# Patient Record
Sex: MALE | Race: OTHER | HISPANIC OR LATINO | URBAN - METROPOLITAN AREA
[De-identification: names, ages, dates, MRNs, and addresses within clinical notes are randomized per-mention and may not be internally consistent; named-entity substitution may affect disease eponyms.]

---

## 2024-07-26 ENCOUNTER — EMERGENCY (EMERGENCY)
Facility: HOSPITAL | Age: 34
LOS: 0 days | Discharge: HOME | End: 2024-07-26
Attending: STUDENT IN AN ORGANIZED HEALTH CARE EDUCATION/TRAINING PROGRAM
Payer: MEDICAID

## 2024-07-26 VITALS
SYSTOLIC BLOOD PRESSURE: 137 MMHG | RESPIRATION RATE: 18 BRPM | HEART RATE: 88 BPM | OXYGEN SATURATION: 97 % | DIASTOLIC BLOOD PRESSURE: 88 MMHG | TEMPERATURE: 98 F

## 2024-07-26 VITALS
DIASTOLIC BLOOD PRESSURE: 89 MMHG | HEART RATE: 112 BPM | OXYGEN SATURATION: 96 % | SYSTOLIC BLOOD PRESSURE: 153 MMHG | RESPIRATION RATE: 20 BRPM | WEIGHT: 192.9 LBS | HEIGHT: 66 IN | TEMPERATURE: 98 F

## 2024-07-26 DIAGNOSIS — M79.89 OTHER SPECIFIED SOFT TISSUE DISORDERS: ICD-10-CM

## 2024-07-26 DIAGNOSIS — S52.611A DISPLACED FRACTURE OF RIGHT ULNA STYLOID PROCESS, INITIAL ENCOUNTER FOR CLOSED FRACTURE: ICD-10-CM
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
## 2024-07-26 DIAGNOSIS — S52.571A OTHER INTRAARTICULAR FRACTURE OF LOWER END OF RIGHT RADIUS, INITIAL ENCOUNTER FOR CLOSED FRACTURE: ICD-10-CM

## 2024-07-26 DIAGNOSIS — M79.641 PAIN IN RIGHT HAND: ICD-10-CM
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
## 2024-07-26 PROCEDURE — 99285 EMERGENCY DEPT VISIT HI MDM: CPT | Mod: 25

## 2024-07-26 PROCEDURE — 25605 CLTX DST RDL FX/EPHYS SEP W/: CPT | Mod: 54,RT

## 2024-07-26 PROCEDURE — 73110 X-RAY EXAM OF WRIST: CPT | Mod: RT

## 2024-07-26 PROCEDURE — 73090 X-RAY EXAM OF FOREARM: CPT | Mod: RT

## 2024-07-26 PROCEDURE — 25605 CLTX DST RDL FX/EPHYS SEP W/: CPT | Mod: RT

## 2024-07-26 PROCEDURE — 73110 X-RAY EXAM OF WRIST: CPT | Mod: 26,RT

## 2024-07-26 PROCEDURE — 73130 X-RAY EXAM OF HAND: CPT | Mod: 26,RT

## 2024-07-26 PROCEDURE — 99285 EMERGENCY DEPT VISIT HI MDM: CPT | Mod: 57

## 2024-07-26 PROCEDURE — 73130 X-RAY EXAM OF HAND: CPT | Mod: RT

## 2024-07-26 PROCEDURE — 73090 X-RAY EXAM OF FOREARM: CPT | Mod: 26,RT

## 2024-07-26 NOTE — ED PROVIDER NOTE - NSFOLLOWUPINSTRUCTIONS_ED_ALL_ED_FT
Por favor lul un seguimiento con un especialista en cirugía de la mano. Nuestros coordinadores de referencias del departamento de emergencias se comunicarán con usted en las próximas 24 a  48 horas de 9:00 a. m. a 5:00 p. m. (de lunes a viernes) con hernandez nanette de seguimiento. Espere hernandez llamada telefónica del hospital en tamia período de tiempo. Si no recibe hernandez llamada o si tiene alguna pregunta o inquietud, puede comunicarse con ellos al [phone].       Fractura de juno tratada con inmovilización  Wrist Fracture Treated With Immobilization    La fractura de juno es la rotura o fisura de joshua de los huesos que la componen. La juno se compone de ocho huesos pequeños que se encuentran en la muñoz de la mano (huesos carpianos) y dos huesos largos que componen el antebrazo (radio y cúbito).    Si la articulación está estable y los huesos aún están en carroll posición normal (sin desplazamiento), la lesión se puede tratar con inmovilización. Wilder implica el uso de un yeso, hernandez férula o un cabestrillo para sostener la juno en el lugar. La inmovilización asegura que los huesos se mantengan en la posición correcta mientras la juno se alessio.    ¿Cuáles son las causas?  Esta afección puede ser causada por lo siguiente:  Hernandez fuerza directa en la juno.  Los traumatismos, jasen un choque automovilístico o hernadnez caída sobre hernandez mano extendida.  ¿Qué incrementa el riesgo?  Los siguientes factores pueden hacer que sea más propenso a desarrollar esta afección:  Hacer deportes de contacto o de alto riesgo, jasen esquí, ciclismo o patinaje sobre hielo.  López medicamentos con corticoesteroides.  Fumar.  Ser rashmi.  Ser de wyatt caucásica.  Consumir más de tatiana bebidas alcohólicas por día.  Tener hernandez afección que debilita los huesos (osteoporosis).  El envejecimiento.  Tener antecedentes de fractura.  ¿Cuáles son los signos o los síntomas?  Los síntomas de esta afección incluyen:  Dolor.  Hinchazón.  Moretones.  Imposibilidad para  la juno normalmente.  La juno también puede colgar en hernandez posición extraña o verse deformada.    ¿Cómo se diagnostica?  Esta afección se puede diagnosticar con un examen físico y radiografías. También se puede realizar hernandez exploración por tomografía computarizada (TC) o resonancia magnética (RM).    ¿Cómo se trata?  El tratamiento para esta afección incluye usar un yeso, hernandez férula o un cabestrillo hasta que la lisbeth de la lesión esté suficientemente estable para que usted pueda empezar a hacer ejercicios de flexibilidad. Además, es posible que le receten analgésicos.    Siga estas instrucciones en carroll casa:  Si tiene un yeso:    No introduzca nada dentro del yeso para rascarse la piel. Wilder puede aumentar el riesgo de contraer hernandez infección.  Controle la piel alrededor del yeso todos los días. Informe al médico acerca de cualquier inquietud.  Puede aplicar hernandez loción en la piel seca alrededor de los bordes del yeso. No aplique loción en la piel por debajo del yeso.  Mantenga el yeso seco y limpio.  Si tiene hernandez férula o un cabestrillo:    Úselos jasen se lo haya indicado el médico. Quíteselos solamente jasen se lo haya indicado el médico.  Afloje la férula o el cabestrillo si los dedos de la mano se le adormecen, siente hormigueos, o se le enfrían y se tornan de color parker.  Mantenga el cabestrillo o la férula limpios y secos.  Bañarse    No tome robert de inmersión, no nade ni use el jacuzzi hasta que el médico lo autorice. Pregúntele al médico si puede ducharse. Yared vez solo le permitan darse robert de esponja.  Si el yeso, la férula o el cabestrillo no son impermeables:  No deje que se mojen.  Cúbralos con un envoltorio hermético cuando tome un baño de inmersión o hernandez ducha.  Si tiene un cabestrillo, quíteselo para bañarse sólo si el médico se lo permite.  Control del dolor, la rigidez y la hinchazón      Si se lo indican, aplique hielo sobre la lisbeth de la lesión. Para hacer esto:  Si tiene hernandez férula o un cabestrillo desmontables, quíteselos jasen se lo haya indicado el médico.  Ponga el hielo en hernandez bolsa plástica.  Coloque hernandez toalla entre la piel y la bolsa de hielo o el yeso y la bolsa de hielo.  Aplique el hielo beni 20 minutos, 2 o 3 veces por día.  Retire el hielo si la piel se pone de color petersen brillante. Wilder es muy importante. Si no puede sentir dolor, calor o frío, tiene un mayor riesgo de que se dañe la lisbeth.  Mueva los dedos con frecuencia para reducir la rigidez y la hinchazón.  Cuando esté sentado o acostado, levante (eleve) la lisbeth lesionada por encima del nivel del corazón.  Actividad    Retome lucila actividades normales según lo indicado por el médico. Pregúntele al médico qué actividades son seguras para usted.  No levante ningún objeto la juno lesionada ni ponga peso sobre danish hasta que el médico lo autorice.  Pregúntele al médico cuándo puede volver a conducir si tiene un yeso, hernandez férula o un cabestrillo en la juno.  Lul ejercicios de flexibilidad solamente jasen se lo haya indicado el médico o el fisioterapeuta.  Medicamentos    Use los medicamentos de venta nina y los recetados solamente jasen se lo haya indicado el médico.  Pregúntele al médico si el medicamento recetado:  Hace necesario que evite conducir o usar maquinaria.  Puede causarle estreñimiento. Es posible que tenga que lópez estas medidas para prevenir o tratar el estreñimiento:  Beber suficiente líquido jasen para mantener la orina de color amarillo pálido.  Usar medicamentos recetados o de venta nina.  Consumir alimentos ricos en fibra, jasen frijoles, cereales integrales, y frutas y verduras frescas.  Limitar el consumo de alimentos ricos en grasa y azúcares procesados, jasen los alimentos fritos o dulces.  Indicaciones generales    No ejerza presión en ninguna parte del yeso o de la férula hasta que se haya endurecido por completo. Wilder puede tardar varias horas.  No consuma ningún producto que contenga nicotina o tabaco, jasen cigarrillos, cigarrillos electrónicos y tabaco de mascar. Estos pueden retrasar la consolidación del hueso. Si necesita ayuda para dejar de consumir estos productos, consulte al médico.  Cumpla con todas las visitas de seguimiento. Wilder es importante.  Comuníquese con un médico si:  Carroll yeso, férula o cabestrillo se daña o se afloja.  Tiene dolor, hinchazón o moretones nuevos.  El dolor, la hinchazón o los moretones no mejoran.  Tiene fiebre.  Tiene escalofríos.  Solicite ayuda de inmediato si:  La piel o los dedos del brazo lesionado se tornan azules o grises.  Siente el brazo adormecido o frío.  Siente un dolor muy intenso en la juno lesionada.  Resumen  La fractura de juno es la rotura o fisura de joshua de los huesos que la componen.  Si la articulación está estable y los huesos aún están en carroll posición normal, la lesión se puede tratar con el uso de un yeso, hernandez férula o un cabestrillo.  Si tiene un yeso, revise la piel de alrededor todos los días. Informe al médico acerca de cualquier inquietud.  Si tiene hernandez férula o un cabestrillo, úselos jasen se lo haya indicado el médico. Quíteselos solamente jasen se lo haya indicado el médico.

## 2024-07-26 NOTE — ED PROVIDER NOTE - PHYSICAL EXAMINATION
Vital Signs: I have reviewed the initial vital signs.  Constitutional: appears stated age, no acute distress  Eyes: Sclera clear, EOMI.  Cardiovascular: S1 and S2, regular rate, regular rhythm, well-perfused extremities, radial pulses equal and 2+, pedal pulses 2+ and equal  Respiratory: unlabored respiratory effort, clear to auscultation bilaterally no wheezing, rales, or rhonchi  Musculoskeletal: supple neck, no lower extremity edema, + swelling and tenderness to palpation to right medial and lateral wrist, + swelling to right proximal volar hand. full range of motion 5/5 strength and sensation intact to right hand  Integumentary: warm, dry, no rash  Neurologic: awake, alert, oriented x3, extremities’ motor and sensory functions grossly intact

## 2024-07-26 NOTE — ED PROVIDER NOTE - CLINICAL SUMMARY MEDICAL DECISION MAKING FREE TEXT BOX
Patient right distal radius fracture 3 days ago after falling FOOSH injury reduced    Independent interpretation of the Xray film(s) performed by: Francisco Pace  Reduced fracture  Appropriate medications for patient's presenting complaints were ordered and effects were reassessed.   Patient's external records were reviewed. Additional history was obtained from.    Escalation to admission and/or observation was considered.  Patient feels much better and is comfortable with discharge.  Appropriate follow-up was arranged.

## 2024-07-26 NOTE — ED PROVIDER NOTE - OBJECTIVE STATEMENT
33-year-old male presents with complaints of right hand pain.  States he fell 3 days ago onto an outstretched hand in front of him and since then has had pain and swelling to the right hand and right wrist.  Reports he is right-handed.  States the right hand is most painful with movements, denies pain with immobilization.  Denies other injury/trauma, head trauma, focal numbness/weakness, rash, fever/chills, lightheadedness/dizziness.

## 2024-07-26 NOTE — ED PROVIDER NOTE - CARE PROVIDER_API CALL
Yaniv Marroquin  Orthopaedic Surgery  [address] [place] [address]  Phone: [phone]  Fax: [phone]  Follow Up Time: 1-3 Days

## 2024-07-26 NOTE — ED PROVIDER NOTE - PATIENT PORTAL LINK FT
You can access the FollowMyHealth Patient Portal offered by [facility] by registering at the following website: [email or web address]. By joining [employer]’s FollowMyHealth portal, you will also be able to view your health information using other applications (apps) compatible with our system.

## 2024-07-26 NOTE — ED ADULT NURSE NOTE - NSFALLUNIVINTERV_ED_ALL_ED
Bed/Stretcher in lowest position, wheels locked, appropriate side rails in place/Call bell, personal items and telephone in reach/Instruct patient to call for assistance before getting out of bed/chair/stretcher/Non-slip footwear applied when patient is off stretcher/[place] to call system/Physically safe environment - no spills, clutter or unnecessary equipment/Purposeful proactive rounding/Room/bathroom lighting operational, light cord in reach

## 2024-07-30 NOTE — CHART NOTE - NSCHARTNOTEFT_GEN_A_CORE
[facility] MRN [id number] / Left message 7/29 - JL / Called 2x. Left message via  7/30 - JL    Specialty: Orthopedic

## 2025-01-21 ENCOUNTER — EMERGENCY (EMERGENCY)
Facility: HOSPITAL | Age: 35
LOS: 0 days | Discharge: HOME | End: 2025-01-21
Attending: STUDENT IN AN ORGANIZED HEALTH CARE EDUCATION/TRAINING PROGRAM
Payer: SELF-PAY

## 2025-01-21 VITALS
HEIGHT: 65 IN | OXYGEN SATURATION: 99 % | TEMPERATURE: 98 F | SYSTOLIC BLOOD PRESSURE: 160 MMHG | WEIGHT: 160.06 LBS | DIASTOLIC BLOOD PRESSURE: 95 MMHG | RESPIRATION RATE: 20 BRPM | HEART RATE: 91 BPM

## 2025-01-21 VITALS
RESPIRATION RATE: 20 BRPM | SYSTOLIC BLOOD PRESSURE: 142 MMHG | DIASTOLIC BLOOD PRESSURE: 90 MMHG | HEART RATE: 90 BPM | TEMPERATURE: 99 F | OXYGEN SATURATION: 99 %

## 2025-01-21 DIAGNOSIS — R06.02 SHORTNESS OF BREATH: ICD-10-CM

## 2025-01-21 DIAGNOSIS — K76.0 FATTY (CHANGE OF) LIVER, NOT ELSEWHERE CLASSIFIED: ICD-10-CM

## 2025-01-21 DIAGNOSIS — R07.89 OTHER CHEST PAIN: ICD-10-CM

## 2025-01-21 DIAGNOSIS — R16.0 HEPATOMEGALY, NOT ELSEWHERE CLASSIFIED: ICD-10-CM

## 2025-01-21 LAB
ALBUMIN SERPL ELPH-MCNC: 3.6 G/DL — SIGNIFICANT CHANGE UP (ref 3.5–5.2)
ALP SERPL-CCNC: 326 U/L — HIGH (ref 30–115)
ALT FLD-CCNC: 82 U/L — HIGH (ref 0–41)
ANION GAP SERPL CALC-SCNC: 14 MMOL/L — SIGNIFICANT CHANGE UP (ref 7–14)
AST SERPL-CCNC: 187 U/L — HIGH (ref 0–41)
BASOPHILS # BLD AUTO: 0.05 K/UL — SIGNIFICANT CHANGE UP (ref 0–0.2)
BASOPHILS NFR BLD AUTO: 0.8 % — SIGNIFICANT CHANGE UP (ref 0–1)
BILIRUB SERPL-MCNC: 4.6 MG/DL — HIGH (ref 0.2–1.2)
BUN SERPL-MCNC: 5 MG/DL — LOW (ref 10–20)
CALCIUM SERPL-MCNC: 9.1 MG/DL — SIGNIFICANT CHANGE UP (ref 8.4–10.4)
CHLORIDE SERPL-SCNC: 95 MMOL/L — LOW (ref 98–110)
CO2 SERPL-SCNC: 23 MMOL/L — SIGNIFICANT CHANGE UP (ref 17–32)
CREAT SERPL-MCNC: 0.6 MG/DL — LOW (ref 0.7–1.5)
EGFR: 130 ML/MIN/1.73M2 — SIGNIFICANT CHANGE UP
EOSINOPHIL # BLD AUTO: 0.04 K/UL — SIGNIFICANT CHANGE UP (ref 0–0.7)
EOSINOPHIL NFR BLD AUTO: 0.7 % — SIGNIFICANT CHANGE UP (ref 0–8)
GLUCOSE SERPL-MCNC: 118 MG/DL — HIGH (ref 70–99)
HCT VFR BLD CALC: 39.5 % — LOW (ref 42–52)
HGB BLD-MCNC: 13.7 G/DL — LOW (ref 14–18)
IMM GRANULOCYTES NFR BLD AUTO: 0.2 % — SIGNIFICANT CHANGE UP (ref 0.1–0.3)
LACTATE SERPL-SCNC: 2.1 MMOL/L — HIGH (ref 0.7–2)
LIDOCAIN IGE QN: 57 U/L — SIGNIFICANT CHANGE UP (ref 7–60)
LYMPHOCYTES # BLD AUTO: 0.92 K/UL — LOW (ref 1.2–3.4)
LYMPHOCYTES # BLD AUTO: 15.5 % — LOW (ref 20.5–51.1)
MCHC RBC-ENTMCNC: 30.6 PG — SIGNIFICANT CHANGE UP (ref 27–31)
MCHC RBC-ENTMCNC: 34.7 G/DL — SIGNIFICANT CHANGE UP (ref 32–37)
MCV RBC AUTO: 88.4 FL — SIGNIFICANT CHANGE UP (ref 80–94)
MONOCYTES # BLD AUTO: 0.53 K/UL — SIGNIFICANT CHANGE UP (ref 0.1–0.6)
MONOCYTES NFR BLD AUTO: 9 % — SIGNIFICANT CHANGE UP (ref 1.7–9.3)
NEUTROPHILS # BLD AUTO: 4.37 K/UL — SIGNIFICANT CHANGE UP (ref 1.4–6.5)
NEUTROPHILS NFR BLD AUTO: 73.8 % — SIGNIFICANT CHANGE UP (ref 42.2–75.2)
NRBC # BLD: 0 /100 WBCS — SIGNIFICANT CHANGE UP (ref 0–0)
PLATELET # BLD AUTO: 119 K/UL — LOW (ref 130–400)
PMV BLD: 11.4 FL — HIGH (ref 7.4–10.4)
POTASSIUM SERPL-MCNC: 4 MMOL/L — SIGNIFICANT CHANGE UP (ref 3.5–5)
POTASSIUM SERPL-SCNC: 4 MMOL/L — SIGNIFICANT CHANGE UP (ref 3.5–5)
PROT SERPL-MCNC: 7.7 G/DL — SIGNIFICANT CHANGE UP (ref 6–8)
RBC # BLD: 4.47 M/UL — LOW (ref 4.7–6.1)
RBC # FLD: 18.8 % — HIGH (ref 11.5–14.5)
SODIUM SERPL-SCNC: 132 MMOL/L — LOW (ref 135–146)
TROPONIN T, HIGH SENSITIVITY RESULT: <6 NG/L — SIGNIFICANT CHANGE UP (ref 6–21)
WBC # BLD: 5.92 K/UL — SIGNIFICANT CHANGE UP (ref 4.8–10.8)
WBC # FLD AUTO: 5.92 K/UL — SIGNIFICANT CHANGE UP (ref 4.8–10.8)

## 2025-01-21 PROCEDURE — 71046 X-RAY EXAM CHEST 2 VIEWS: CPT

## 2025-01-21 PROCEDURE — 80053 COMPREHEN METABOLIC PANEL: CPT

## 2025-01-21 PROCEDURE — 84484 ASSAY OF TROPONIN QUANT: CPT

## 2025-01-21 PROCEDURE — 83690 ASSAY OF LIPASE: CPT

## 2025-01-21 PROCEDURE — 85025 COMPLETE CBC W/AUTO DIFF WBC: CPT

## 2025-01-21 PROCEDURE — 36415 COLL VENOUS BLD VENIPUNCTURE: CPT

## 2025-01-21 PROCEDURE — 93010 ELECTROCARDIOGRAM REPORT: CPT

## 2025-01-21 PROCEDURE — 74177 CT ABD & PELVIS W/CONTRAST: CPT | Mod: MC

## 2025-01-21 PROCEDURE — 99285 EMERGENCY DEPT VISIT HI MDM: CPT

## 2025-01-21 PROCEDURE — 74177 CT ABD & PELVIS W/CONTRAST: CPT | Mod: 26

## 2025-01-21 PROCEDURE — 36000 PLACE NEEDLE IN VEIN: CPT | Mod: XU

## 2025-01-21 PROCEDURE — 83605 ASSAY OF LACTIC ACID: CPT

## 2025-01-21 PROCEDURE — 71046 X-RAY EXAM CHEST 2 VIEWS: CPT | Mod: 26

## 2025-01-21 PROCEDURE — 93005 ELECTROCARDIOGRAM TRACING: CPT

## 2025-01-21 PROCEDURE — 99285 EMERGENCY DEPT VISIT HI MDM: CPT | Mod: 25

## 2025-01-21 RX ORDER — SODIUM CHLORIDE 9 MG/ML
1000 INJECTION, SOLUTION INTRAMUSCULAR; INTRAVENOUS; SUBCUTANEOUS ONCE
Refills: 0 | Status: DISCONTINUED | OUTPATIENT
Start: 2025-01-21 | End: 2025-01-21

## 2025-01-21 NOTE — ED PROVIDER NOTE - PATIENT PORTAL LINK FT
You can access the FollowMyHealth Patient Portal offered by [facility] by registering at the following website: [email or web address]. By joining [employer]’s FollowMyHealth portal, you will also be able to view your health information using other applications (apps) compatible with our system.

## 2025-01-21 NOTE — ED ADULT NURSE NOTE - OBJECTIVE STATEMENT
pTv with HX alcohol intoxication with C/O  left side chest  and middle sternum chest pain ,with RLQ pain on and off for 3 days . .Pt denies fever chills ,denies N/V last drink  yesterday .

## 2025-01-21 NOTE — ED PROVIDER NOTE - CLINICAL SUMMARY MEDICAL DECISION MAKING FREE TEXT BOX
34 yr old m that presents with mutliple medical complaints. labs, ekg, imaging, Labs and EKG were ordered and reviewed.  Imaging was ordered and reviewed by me.  Appropriate medications for patient's presenting complaints were ordered and effects were reassessed.  Patient's records (prior hospital, ED visit, and/or nursing home notes if available) were reviewed.  Additional history was obtained from EMS, family, and/or PCP (where available).  Escalation to admission/observation was considered. However patient feels much better and is comfortable with discharge.  Appropriate follow-up was arranged.    I have discussed the discharge plan with the patient. The patient agrees with the plan, as discussed.  The patient understands Emergency Department diagnosis is a preliminary diagnosis often based on limited information and that the patient must adhere to the follow-up plan as discussed.  The patient understands that if the symptoms worsen or if prescribed medications do not have the desired/planned effect that the patient may return to the Emergency Department at any time for further evaluation and treatment.

## 2025-01-21 NOTE — ED PROVIDER NOTE - CARE PROVIDER_API CALL
Obinna Aiken  Gastroenterology  [address] [place] [id number]  Phone: [phone]  Fax: [phone]  Follow Up Time:     Duncan Atkins  Interventional Cardiology  [address] [place] [address], [facility]  [place] [address]  Phone: [phone]  Fax: [phone]  Follow Up Time:

## 2025-01-21 NOTE — ED ADULT NURSE NOTE - IS PATIENT ABLE TO BE SCREENED?
PA AND LATERAL CHEST:

1/31[birth year]

 

HISTORY: 

Heart started racing after athletics glass.

 

Heart size is within normal limits. Mediastinal structures are unremarkable. The lungs are clear of a
ny infiltrative process. No bony findings.

 

IMPRESSION:  

No active intrathoracic disease. 

 

POS: [facility] Yes

## 2025-01-21 NOTE — ED PROVIDER NOTE - ATTENDING APP SHARED VISIT CONTRIBUTION OF CARE
34 yr old m w/ no pmh who presents with multiple medical complaints. As per pt he has been having chest pain on and off for 2 yrs and abd pain for 1 yr. Pt states that the chest pain at times, that is not associated with movement or sob. In terms of the abd pain, pt denies any nausea, vomiting, diarrhea, urinary symptoms or any other medical complaints. Pt states that ~ 1 yr ago he did go to a clinic that gave him meds (unsure name or what they were form) that helped. Pt has not followed up with them again.     VITAL SIGNS: I have reviewed nursing notes and confirm.  CONSTITUTIONAL: non-toxic, well appearing  SKIN: no rash, no petechiae.  EYES: EOMI, pink conjunctiva, anicteric  ENT: tongue midline, no exudates, MMM  NECK: Supple; no meningismus, no JVD  CARD: RRR, no murmurs, equal radial pulses bilaterally 2+  RESP: CTAB, no respiratory distress  ABD: Soft, non-tender, non-distended, no peritoneal signs, no HSM, no CVA tenderness  : no hernias, no lesions, no testicular swelling/tenderness  EXT: Normal ROM x4. No edema. No calves tenderness  NEURO: Alert, oriented. CN2-12 intact, equal strength bilaterally, nl gait.  PSYCH: Cooperative, appropriate.      34 yr old m that presents with mutliple medical complaints. labs, ekg, imaging, reassess. dispo pending.

## 2025-01-21 NOTE — ED PROVIDER NOTE - PHYSICAL EXAMINATION
CONSTITUTIONAL: Well-appearing; well-nourished; in no apparent distress.   EYES: PERRL; EOM intact.   ENT: normal nose; no rhinorrhea; normal pharynx with no tonsillar hypertrophy.   NECK: Supple; non-tender; no cervical lymphadenopathy.   CARDIOVASCULAR: Normal S1, S2; no murmurs, rubs, or gallops. Equal radial pulses  RESPIRATORY: Normal chest excursion with respiration; breath sounds clear and equal bilaterally; no wheezes, rhonchi, or rales.  GI/: Normal bowel sounds; non-distended; + mild ruq ttp. No rebound or guarding.  no palpable organomegaly.   MS: No evidence of trauma or deformity.  Normal ROM in all four extremities; non-tender to palpation; distal pulses are normal.   SKIN: Normal for age and race; warm; dry; good turgor; no apparent lesions or exudate.   NEURO/PSYCH: A & O x 4; grossly unremarkable. mood and manner are appropriate. Grooming and personal hygiene are appropriate.

## 2025-01-21 NOTE — ED PROVIDER NOTE - NSFOLLOWUPINSTRUCTIONS_ED_ALL_ED_FT
Chest Pain    Chest pain can be caused by many different conditions which may or may not be dangerous. Causes include heartburn, lung infections, heart attack, blood clot in lungs, skin infections, strain or damage to muscle, cartilage, or bones, etc. In addition to a history and physical examination, an electrocardiogram (ECG) or other lab tests may have been performed to determine the cause of your chest pain. Follow up with your primary care provider or with a cardiologist as instructed.     SEEK IMMEDIATE MEDICAL CARE IF YOU HAVE ANY OF THE FOLLOWING SYMPTOMS: worsening chest pain, coughing up blood, unexplained back/neck/jaw pain, severe abdominal pain, dizziness or lightheadedness, fainting, shortness of breath, sweaty or clammy skin, vomiting, or racing heart beat. These symptoms may represent a serious problem that is an emergency. Do not wait to see if the symptoms will go away. Get medical help right away. Call 911 and do not drive yourself to the hospital.    Fatty Liver Disease (Steatotic Liver Disease): What to Know  Front view of an adult. A close-up shows the difference between a normal and fatty liver.  Your liver is an organ with many jobs. It makes proteins and helps change food into energy. It also gets rid of harmful things in your blood and absorbs vitamins from food.    Fatty liver disease happens when too much fat builds up in your liver cells. It's also called steatotic liver disease.    In many cases, fatty liver disease doesn't cause symptoms. But over time, it can cause irritation and swelling. This can lead to other liver problems, such as:  Cirrhosis, or scarring of the liver.  Liver cancer.  Liver failure.  What are the causes?  Fatty liver disease may be caused by:  Being overweight.  Having:  High cholesterol.  High blood pressure.  Cushing syndrome.  Not getting enough nutrients in your diet.  Other causes include:  Certain drugs.  Poisons.  Some infections caused by a germ called a virus.  What increases the risk?  You're more likely to get fatty liver disease if:  You drink alcohol.  You're overweight.  You have diabetes.  You have hepatitis.  You have a high triglyceride level.  You're pregnant.  What are the signs or symptoms?  You may not have symptoms. If you do, they may include:  Feeling weak and tired.  Losing weight.  Feeling like you may throw up.  Throwing up.  Jaundice. This is when your skin or the white parts of your eyes turn yellow.  Swelling in your belly or legs.  Tenderness in the right-upper part of your belly.  How is this diagnosed?  Fatty liver disease may be diagnosed based on your medical history and an exam. You may also need tests. These may include:  Blood tests.  An ultrasound.  A CT scan.  An MRI.  A biopsy. This is when a small piece of tissue is removed from your liver for testing.  How is this treated?  Fatty liver disease is often caused by other conditions. You may need to take medicines and make changes to your daily life. These changes may help you manage conditions, such as:  Alcohol use disorder. This is a condition where you may not be able to stop drinking.  High cholesterol.  Diabetes.  Being overweight.  Follow these instructions at home:  Eat healthy. Work with your health care provider or an expert in healthy eating called a dietitian. They can help you make an eating plan.  Get enough exercise.  This can help you lose weight. It can also help you manage your cholesterol and diabetes.  Talk to your provider about an exercise plan. Ask what things are best for you to do.  Do not drink alcohol. If you have trouble quitting, ask your provider for help.  Take your medicines only as told.  Keep all follow-up visits. Your provider will check if you're getting better.  Contact a health care provider if:  You can't control your blood sugar. This is extra important if you have diabetes.  You have a fever.  You have swelling in your belly or legs.  You have belly pain.  You have jaundice.  You feel like you may throw up.  You throw up.  Get help right away if:  You throw up, and it looks like:  Bright red blood.  Coffee grounds.  You throw up something that looks like coffee ground.  Your poop looks bloody or black.  You get confused.  These symptoms may be an emergency. Call 911 right away.  Do not wait to see if the symptoms will go away.  Do not drive yourself to the hospital.  This information is not intended to replace advice given to you by your health care provider. Make sure you discuss any questions you have with your health care provider.

## 2025-01-21 NOTE — ED PROVIDER NOTE - CARE PLAN
Principal Discharge DX:	Chest pain  Secondary Diagnosis:	Fatty liver  Secondary Diagnosis:	Hepatomegaly   1

## 2025-01-21 NOTE — ED PROVIDER NOTE - OBJECTIVE STATEMENT
34 year old M daily etoh use (3 beers a day), c/o 2 months of L sided chest pain. Pain is sharp, intermittent with some radiation to L shoulder. + mild sob. Pt also c/o rt sided abd pain x 1 year. Sts had episode of n/v this am. No fever/chills, cough, uri symptoms, flank pain, urinary symptoms, leg pain or swelling, cardiac hx.

## 2025-01-21 NOTE — ED ADULT NURSE REASSESSMENT NOTE - NS ED NURSE REASSESS COMMENT FT1
Pt reassessed a/O times 4 VS stable on cardiac monitor denies chest pain denies SOB no dizziness did eat 75% of dinner ,pt  CT and  Xray prder is done completed ,pt is clear to go home NAD noted ambulate steady .
